# Patient Record
Sex: FEMALE | Race: WHITE | NOT HISPANIC OR LATINO | Employment: FULL TIME | URBAN - METROPOLITAN AREA
[De-identification: names, ages, dates, MRNs, and addresses within clinical notes are randomized per-mention and may not be internally consistent; named-entity substitution may affect disease eponyms.]

---

## 2021-10-08 ENCOUNTER — TELEPHONE (OUTPATIENT)
Dept: SCHEDULING | Facility: IMAGING CENTER | Age: 61
End: 2021-10-08

## 2021-10-12 ENCOUNTER — OFFICE VISIT (OUTPATIENT)
Dept: MEDICAL GROUP | Facility: PHYSICIAN GROUP | Age: 61
End: 2021-10-12
Payer: OTHER GOVERNMENT

## 2021-10-12 ENCOUNTER — APPOINTMENT (OUTPATIENT)
Dept: RADIOLOGY | Facility: IMAGING CENTER | Age: 61
End: 2021-10-12
Attending: NURSE PRACTITIONER
Payer: OTHER GOVERNMENT

## 2021-10-12 VITALS
BODY MASS INDEX: 31.21 KG/M2 | WEIGHT: 182.8 LBS | TEMPERATURE: 100 F | RESPIRATION RATE: 20 BRPM | HEIGHT: 64 IN | SYSTOLIC BLOOD PRESSURE: 130 MMHG | HEART RATE: 126 BPM | OXYGEN SATURATION: 98 % | DIASTOLIC BLOOD PRESSURE: 92 MMHG

## 2021-10-12 DIAGNOSIS — R00.0 TACHYCARDIA: ICD-10-CM

## 2021-10-12 DIAGNOSIS — Z12.31 VISIT FOR SCREENING MAMMOGRAM: ICD-10-CM

## 2021-10-12 DIAGNOSIS — U09.9 COVID-19 LONG HAULER: ICD-10-CM

## 2021-10-12 DIAGNOSIS — Z76.89 ENCOUNTER TO ESTABLISH CARE: ICD-10-CM

## 2021-10-12 DIAGNOSIS — R87.629 ABNORMAL VAGINAL PAP SMEAR: ICD-10-CM

## 2021-10-12 DIAGNOSIS — R73.01 IMPAIRED FASTING GLUCOSE: ICD-10-CM

## 2021-10-12 PROBLEM — R87.611 ATYPICAL SQUAMOUS CELLS CANNOT EXCLUDE HIGH GRADE SQUAMOUS INTRAEPITHELIAL LESION ON CYTOLOGIC SMEAR OF CERVIX (ASC-H): Status: ACTIVE | Noted: 2017-04-26

## 2021-10-12 PROBLEM — N95.0 POST-MENOPAUSAL BLEEDING: Status: ACTIVE | Noted: 2021-07-13

## 2021-10-12 PROBLEM — Z87.891 FORMER SMOKER: Status: ACTIVE | Noted: 2018-05-21

## 2021-10-12 PROBLEM — N87.9 CERVICAL DYSPLASIA: Status: ACTIVE | Noted: 2019-09-20

## 2021-10-12 PROBLEM — Z91.89 DES EXPOSURE IN UTERO: Status: ACTIVE | Noted: 2017-04-26

## 2021-10-12 PROCEDURE — 99214 OFFICE O/P EST MOD 30 MIN: CPT | Mod: CS | Performed by: NURSE PRACTITIONER

## 2021-10-12 PROCEDURE — 71046 X-RAY EXAM CHEST 2 VIEWS: CPT | Mod: TC,FY | Performed by: NURSE PRACTITIONER

## 2021-10-12 RX ORDER — METOPROLOL SUCCINATE 25 MG/1
25 TABLET, EXTENDED RELEASE ORAL DAILY
COMMUNITY
End: 2021-10-20 | Stop reason: SDUPTHER

## 2021-10-12 RX ORDER — DIPHENHYDRAMINE HCL 50 MG
50 CAPSULE ORAL EVERY 6 HOURS PRN
COMMUNITY
End: 2023-08-05

## 2021-10-12 ASSESSMENT — ANXIETY QUESTIONNAIRES
6. BECOMING EASILY ANNOYED OR IRRITABLE: NOT AT ALL
7. FEELING AFRAID AS IF SOMETHING AWFUL MIGHT HAPPEN: NOT AT ALL
5. BEING SO RESTLESS THAT IT IS HARD TO SIT STILL: NOT AT ALL
1. FEELING NERVOUS, ANXIOUS, OR ON EDGE: NOT AT ALL
3. WORRYING TOO MUCH ABOUT DIFFERENT THINGS: NOT AT ALL
4. TROUBLE RELAXING: NOT AT ALL
GAD7 TOTAL SCORE: 0
2. NOT BEING ABLE TO STOP OR CONTROL WORRYING: NOT AT ALL

## 2021-10-12 ASSESSMENT — PATIENT HEALTH QUESTIONNAIRE - PHQ9: CLINICAL INTERPRETATION OF PHQ2 SCORE: 0

## 2021-10-12 NOTE — ASSESSMENT & PLAN NOTE
Patient reports that she took an at home Covid test approximately 6 weeks ago and it was positive for Covid.  1 to 2 weeks after her positive test she was still feeling ill and followed up in the ER where she was diagnosed with Covid pneumonia.  She reports that she was seen at [facility].  I do not have records to review.  She reports that she was sent home on oxygen, steroids and she believes antibiotics.    Patient reports today that she continues to have GI upset, brain fog, rhinitis, constipation as well as rapid heart rate.     Today she denies any chest pain, cough, shortness of breath, chronic fatigue, or headache.

## 2021-10-12 NOTE — ASSESSMENT & PLAN NOTE
Patient reports that this is a new condition.  She reports as of last Wednesday her watch started indicating that she had an elevated heart rate ranging from 100-120's.    Patient was diagnosed with Covid 6 weeks ago.    Positive for headaches and feeling her heart racing.  She denies any palpitations, chest pain, shortness of breath, nausea or vomiting, or jaw or arm pain.

## 2021-10-12 NOTE — PATIENT INSTRUCTIONS
Increase metoprolol to 2 tablets daily for the next 3 days and monitor heart rate.    Sinus Tachycardia    Sinus tachycardia is a kind of fast heartbeat. In sinus tachycardia, the heart beats more than 100 times a minute. Sinus tachycardia starts in a part of the heart called the sinus node. Sinus tachycardia may be harmless, or it may be a sign of a serious condition.  What are the causes?  This condition may be caused by:  · Exercise or exertion.  · A fever.  · Pain.  · Loss of body fluids (dehydration).  · Severe bleeding (hemorrhage).  · Anxiety and stress.  · Certain substances, including:  ? Alcohol.  ? Caffeine.  ? Tobacco and nicotine products.  ? Cold medicines.  ? Illegal drugs.  · Medical conditions including:  ? Heart disease.  ? An infection.  ? An overactive thyroid (hyperthyroidism).  ? A lack of red blood cells (anemia).  What are the signs or symptoms?  Symptoms of this condition include:  · A feeling that the heart is beating quickly (palpitations).  · Suddenly noticing your heartbeat (cardiac awareness).  · Dizziness.  · Tiredness (fatigue).  · Shortness of breath.  · Chest pain.  · Nausea.  · Fainting.  How is this diagnosed?  This condition is diagnosed with:  · A physical exam.  · Other tests, such as:  ? Blood tests.  ? An electrocardiogram (ECG). This test measures the electrical activity of the heart.  ? Ambulatory cardiac monitor. This records your heartbeats for 24 hours or more.  You may be referred to a heart specialist (cardiologist).  How is this treated?  Treatment for this condition depends on the cause or the underlying condition. Treatment may involve:  · Treating the underlying condition.  · Taking new medicines or changing your current medicines as told by your health care provider.  · Making changes to your diet or lifestyle.  Follow these instructions at home:  Lifestyle    · Do not use any products that contain nicotine or tobacco, such as cigarettes and e-cigarettes. If you  need help quitting, ask your health care provider.  · Do not use illegal drugs, such as cocaine.  · Learn relaxation methods to help you when you get stressed or anxious. These include deep breathing.  · Avoid caffeine or other stimulants.  Alcohol use    · Do not drink alcohol if:  ? Your health care provider tells you not to drink.  ? You are pregnant, may be pregnant, or are planning to become pregnant.  · If you drink alcohol, limit how much you have:  ? 0-1 drink a day for women.  ? 0-2 drinks a day for men.  · Be aware of how much alcohol is in your drink. In the [place] one drink equals one typical bottle of beer (12 oz), one-half glass of wine (5 oz), or one shot of hard liquor (1½ oz).  General instructions  · Drink enough fluids to keep your urine pale yellow.  · Take over-the-counter and prescription medicines only as told by your health care provider.  · Keep all follow-up visits as told by your health care provider. This is important.  Contact a health care provider if you have:  · A fever.  · Vomiting or diarrhea that does not go away.  Get help right away if you:  · Have pain in your chest, upper arms, jaw, or neck.  · Become weak or dizzy.  · Feel faint.  · Have palpitations that do not go away.  Summary  · In sinus tachycardia, the heart beats more than 100 times a minute.  · Sinus tachycardia may be harmless, or it may be a sign of a serious condition.  · Treatment for this condition depends on the cause or the underlying condition.  · Get help right away if you have pain in your chest, upper arms, jaw, or neck.  This information is not intended to replace advice given to you by your health care provider. Make sure you discuss any questions you have with your health care provider.  Document Released: 01/25/2006 Document Revised: 02/06/2019 Document Reviewed: 02/06/2019  Elsevier Patient Education © 2020 [employer]

## 2021-10-12 NOTE — PROGRESS NOTES
Chief Complaint   Patient presents with   • Establish Care     6w post covid GI concerns, BP high       Subjective:     HPI:   Kat Lama is a 61 y.o. female here to discuss the evaluation and management of:      COVID-19 long hauler  Patient reports that she took an at home Covid test approximately 6 weeks ago and it was positive for Covid.  1 to 2 weeks after her positive test she was still feeling ill and followed up in the ER where she was diagnosed with Covid pneumonia.  She reports that she was seen at [facility].  I do not have records to review.  She reports that she was sent home on oxygen, steroids and she believes antibiotics.    Patient reports today that she continues to have GI upset, brain fog, rhinitis, constipation as well as rapid heart rate.     Today she denies any chest pain, cough, shortness of breath, chronic fatigue, or headache.    Tachycardia  Patient reports that this is a new condition.  She reports as of last Wednesday her watch started indicating that she had an elevated heart rate ranging from 100-120's.    Patient was diagnosed with Covid 6 weeks ago.    Positive for headaches and feeling her heart racing.  She denies any palpitations, chest pain, shortness of breath, nausea or vomiting, or jaw or arm pain.        Abnormal vaginal Pap smear  Pap was performed proximately 3 weeks ago in [place].  Reviewed records indicating positive for HPV high risk.  Patient is asking for referral to GYN today.  I do feel this is appropriate referral was placed.  She does report that she is positive for intermittent bleeding      ROS:  Gen: no fevers/chills, no changes in weight  Eyes: no changes in vision  ENT: no sore throat, no hearing loss, no bloody nose  Pulm: no sob, no cough  CV: Positive per HPI  GI: no nausea/vomiting, no diarrhea  : no dysuria  MSk: no myalgias  Skin: no rash  Neuro: no headaches, no numbness/tingling  Heme/Lymph: no easy bruising    No  Known Allergies    Current medicines (including changes today)  Current Outpatient Medications   Medication Sig Dispense Refill   • metoprolol SR (TOPROL XL) 25 MG TABLET SR 24 HR Take 25 mg by mouth every day.     • diphenhydrAMINE (BENADRYL) 50 MG Cap Take 50 mg by mouth every 6 hours as needed.       No current facility-administered medications for this visit.       Social History     Tobacco Use   • Smoking status: Former Smoker     Packs/day: 0.50     Years: 15.00     Pack years: 7.50     Types: Cigarettes   • Smokeless tobacco: Never Used   Vaping Use   • Vaping Use: Former   • Substances: Nicotine, Flavoring   Substance Use Topics   • Alcohol use: Not Currently   • Drug use: Not on file       Patient Active Problem List    Diagnosis Date Noted   • COVID-19 long hauler 10/12/2021   • Tachycardia 10/12/2021   • Post-menopausal bleeding 07/13/2021   • Impaired fasting glucose 10/01/2020   • Cervical dysplasia 09/20/2019   • Former smoker 05/21/2018   • DURGA exposure in utero 04/26/2017   • Atypical squamous cells cannot exclude high grade squamous intraepithelial lesion on cytologic smear of cervix (ASC-H) 04/26/2017   • Abnormal vaginal Pap smear 02/09/2017   • Gastroesophageal reflux disease without esophagitis 11/18/2015   • Bilateral hearing loss 11/18/2015       Family History   Problem Relation Age of Onset   • Hypertension Mother    • Hyperlipidemia Mother    • Clotting Disorder Mother         blood clots   • Stroke Mother         brain bleed   • Arrythmia Mother         A- fib   • Cancer Father         lung- smoker   • Clotting Disorder Brother         blood clot   • No Known Problems Maternal Grandmother    • No Known Problems Maternal Grandfather    • No Known Problems Paternal Grandmother    • No Known Problems Paternal Grandfather           Objective:       /92 (BP Location: Left arm, Patient Position: Sitting, BP Cuff Size: Adult)   Pulse (!) 126   Temp 37.8 °C (100 °F) (Temporal)   Resp 20   " Ht 1.613 m (5' 3.5\")   Wt 82.9 kg (182 lb 12.8 oz)   SpO2 98%  Body mass index is 31.87 kg/m².    Physical Exam:  Constitutional: Well-developed and well-nourished female in NAD. Not diaphoretic. No distress.   Skin: warm, dry, intact, no evidence of rash or concerning lesions  Head: Atraumatic without lesions.  Eyes: Conjunctivae and extraocular motions are normal. Pupils are equal, round, and reactive to light. No scleral icterus.   Ears:  External ears unremarkable. TMs normal; bilaterally  Mouth/Throat: Tongue normal. Oropharynx is clear and moist. Posterior pharynx without erythema or exudates.  Neck: Supple, trachea midline. No thyromegaly present. No cervical or supraclavicular lymphadenopathy.  Cardiovascular: Tachycardic rate and sinus rhythm without murmur. Radial pulses are intact and equal bilaterally.  Pulmonary: Clear to ausculation. Normal effort. No rales, ronchi, or wheezing.  Abdomen: Soft,  tender with palpation over the suprapubic area, and without distention. Active bowel sounds in all four quadrants. No rebound, guarding, masses   Extremities: No cyanosis, clubbing, erythema, nor edema.   Neurological: Alert and oriented x 3. No cranial nerve deficit. 5/5 myotomes. Sensation intact.   Psychiatric:  Behavior, mood, and affect are appropriate.    EKG Interpretation    Interpreted by AMARIS Wolff    Rhythm: sinus tachycardia  Rate: tachycardia  Axis: normal  Ectopy: none  Conduction: normal  ST Segments: normal  T Waves: normal  Q Waves: none    Clinical Impression: sinus tachycardia      10/12/2021 2:02 PM     HISTORY/REASON FOR EXAM: Tachycardic on exam.     TECHNIQUE/EXAM DESCRIPTION AND NUMBER OF VIEWS:  Two views of the chest.     COMPARISON:  None.     FINDINGS:  Cardiomediastinal contours are normal.     No pulmonary consolidation is seen.     No pleural space process is evident.     IMPRESSION:     No radiographic evidence of acute cardiopulmonary process.    Assessment and " Plan:     The following treatment plan was discussed:      1. Encounter to establish care    2. COVID-19 long hauler  This is a newly assessed condition.  Discussed with patient that post Covid symptoms on average last 4 to 6 months.  Encourage patient to stay well-hydrated and eat well-balanced meals as well as increasing exercise as tolerated to help decrease symptoms.    3. Tachycardia  This is a new condition.  EKG showed sinus tachycardia.  ER precautions were discussed with patient.  Encouraged to stay adequately hydrated. Follow-up visit scheduled for next week with labs.    18:43-called patient and reviewed x-ray results with her and answered all questions. Discussed taking 2 tablets of her metoprolol if heart rate is between 90 and 120 and only take 1 tablet if her heart rate is less than 90.  - DX-CHEST-2 VIEWS; Future  - EKG - Clinic Performed  - CBC WITH DIFFERENTIAL; Future  - TSH WITH REFLEX TO FT4; Future  - Comp Metabolic Panel; Future  - HEMOGLOBIN A1C; Future    4. Impaired fasting glucose  Chronic condition, current status unknown.  Patient is due for labs.  Orders as indicated below.  Discussed the importance of appropriate diet and healthy lifestyle modifications.  - Lipid Profile; Future  - HEMOGLOBIN A1C; Future    5. Abnormal vaginal Pap smear  - REFERRAL TO GYNECOLOGY    6. Visit for screening mammogram  - MA-SCREENING MAMMO BILAT W/TOMOSYNTHESIS W/CAD; Future    Other orders  - metoprolol SR (TOPROL XL) 25 MG TABLET SR 24 HR; Take 25 mg by mouth every day.  - diphenhydrAMINE (BENADRYL) 50 MG Cap; Take 50 mg by mouth every 6 hours as needed.    Any change or worsening of signs or symptoms, patient encouraged to follow-up or report to emergency room for further evaluation. Patient verbalizes understanding and agrees.    Follow-Up: Return in about 1 week (around 10/19/2021) for Follow up labs.      PLEASE NOTE: This dictation was created using voice recognition software. I have made every  reasonable attempt to correct obvious errors, but I expect that there are errors of grammar and possibly content that I did not discover before finalizing the note.   66

## 2021-10-12 NOTE — ASSESSMENT & PLAN NOTE
Pap was performed proximately 3 weeks ago in [place].  Reviewed records indicating positive for HPV high risk.  Patient is asking for referral to GYN today.  I do feel this is appropriate referral was placed.  She does report that she is positive for intermittent bleeding

## 2021-10-14 ENCOUNTER — HOSPITAL ENCOUNTER (OUTPATIENT)
Dept: LAB | Facility: MEDICAL CENTER | Age: 61
End: 2021-10-14
Attending: NURSE PRACTITIONER
Payer: OTHER GOVERNMENT

## 2021-10-14 DIAGNOSIS — R73.01 IMPAIRED FASTING GLUCOSE: ICD-10-CM

## 2021-10-14 DIAGNOSIS — R00.0 TACHYCARDIA: ICD-10-CM

## 2021-10-14 LAB
ALBUMIN SERPL BCP-MCNC: 4.2 G/DL (ref 3.2–4.9)
ALBUMIN/GLOB SERPL: 1.6 G/DL
ALP SERPL-CCNC: 72 U/L (ref 30–99)
ALT SERPL-CCNC: 10 U/L (ref 2–50)
ANION GAP SERPL CALC-SCNC: 11 MMOL/L (ref 7–16)
AST SERPL-CCNC: 9 U/L (ref 12–45)
BASOPHILS # BLD AUTO: 0.6 % (ref 0–1.8)
BASOPHILS # BLD: 0.04 K/UL (ref 0–0.12)
BILIRUB SERPL-MCNC: 0.2 MG/DL (ref 0.1–1.5)
BUN SERPL-MCNC: 6 MG/DL (ref 8–22)
CALCIUM SERPL-MCNC: 9.5 MG/DL (ref 8.5–10.5)
CHLORIDE SERPL-SCNC: 106 MMOL/L (ref 96–112)
CHOLEST SERPL-MCNC: 264 MG/DL (ref 100–199)
CO2 SERPL-SCNC: 26 MMOL/L (ref 20–33)
CREAT SERPL-MCNC: 0.48 MG/DL (ref 0.5–1.4)
EOSINOPHIL # BLD AUTO: 0 K/UL (ref 0–0.51)
EOSINOPHIL NFR BLD: 0 % (ref 0–6.9)
ERYTHROCYTE [DISTWIDTH] IN BLOOD BY AUTOMATED COUNT: 47.9 FL (ref 35.9–50)
EST. AVERAGE GLUCOSE BLD GHB EST-MCNC: 140 MG/DL
FASTING STATUS PATIENT QL REPORTED: NORMAL
GLOBULIN SER CALC-MCNC: 2.7 G/DL (ref 1.9–3.5)
GLUCOSE SERPL-MCNC: 99 MG/DL (ref 65–99)
HBA1C MFR BLD: 6.5 % (ref 4–5.6)
HCT VFR BLD AUTO: 39.7 % (ref 37–47)
HDLC SERPL-MCNC: 47 MG/DL
HGB BLD-MCNC: 12.6 G/DL (ref 12–16)
IMM GRANULOCYTES # BLD AUTO: 0.12 K/UL (ref 0–0.11)
IMM GRANULOCYTES NFR BLD AUTO: 1.7 % (ref 0–0.9)
LDLC SERPL CALC-MCNC: 192 MG/DL
LYMPHOCYTES # BLD AUTO: 2.3 K/UL (ref 1–4.8)
LYMPHOCYTES NFR BLD: 32.7 % (ref 22–41)
MCH RBC QN AUTO: 28.8 PG (ref 27–33)
MCHC RBC AUTO-ENTMCNC: 31.7 G/DL (ref 33.6–35)
MCV RBC AUTO: 90.8 FL (ref 81.4–97.8)
MONOCYTES # BLD AUTO: 0.65 K/UL (ref 0–0.85)
MONOCYTES NFR BLD AUTO: 9.2 % (ref 0–13.4)
NEUTROPHILS # BLD AUTO: 3.92 K/UL (ref 2–7.15)
NEUTROPHILS NFR BLD: 55.8 % (ref 44–72)
NRBC # BLD AUTO: 0 K/UL
NRBC BLD-RTO: 0 /100 WBC
PLATELET # BLD AUTO: 566 K/UL (ref 164–446)
PMV BLD AUTO: 9.9 FL (ref 9–12.9)
POTASSIUM SERPL-SCNC: 4.2 MMOL/L (ref 3.6–5.5)
PROT SERPL-MCNC: 6.9 G/DL (ref 6–8.2)
RBC # BLD AUTO: 4.37 M/UL (ref 4.2–5.4)
SODIUM SERPL-SCNC: 143 MMOL/L (ref 135–145)
TRIGL SERPL-MCNC: 127 MG/DL (ref 0–149)
TSH SERPL DL<=0.005 MIU/L-ACNC: 1.08 UIU/ML (ref 0.38–5.33)
WBC # BLD AUTO: 7 K/UL (ref 4.8–10.8)

## 2021-10-14 PROCEDURE — 84443 ASSAY THYROID STIM HORMONE: CPT

## 2021-10-14 PROCEDURE — 85025 COMPLETE CBC W/AUTO DIFF WBC: CPT

## 2021-10-14 PROCEDURE — 83036 HEMOGLOBIN GLYCOSYLATED A1C: CPT

## 2021-10-14 PROCEDURE — 36415 COLL VENOUS BLD VENIPUNCTURE: CPT

## 2021-10-14 PROCEDURE — 80053 COMPREHEN METABOLIC PANEL: CPT

## 2021-10-14 PROCEDURE — 80061 LIPID PANEL: CPT

## 2021-10-20 RX ORDER — METOPROLOL SUCCINATE 25 MG/1
25 TABLET, EXTENDED RELEASE ORAL DAILY
Qty: 90 TABLET | Refills: 3 | Status: SHIPPED | OUTPATIENT
Start: 2021-10-20 | End: 2021-11-02 | Stop reason: SDUPTHER

## 2021-10-20 NOTE — TELEPHONE ENCOUNTER
Received request via: Patient    Was the patient seen in the last year in this department? Yes    Does the patient have an active prescription (recently filled or refills available) for medication(s) requested? No     Requested Prescriptions     Pending Prescriptions Disp Refills   • metoprolol SR (TOPROL XL) 25 MG TABLET SR 24 HR 90 Tablet 3     Sig: Take 1 Tablet by mouth every day.

## 2021-11-02 ENCOUNTER — OFFICE VISIT (OUTPATIENT)
Dept: MEDICAL GROUP | Facility: PHYSICIAN GROUP | Age: 61
End: 2021-11-02
Payer: OTHER GOVERNMENT

## 2021-11-02 VITALS
HEART RATE: 84 BPM | BODY MASS INDEX: 32.17 KG/M2 | HEIGHT: 64 IN | WEIGHT: 188.4 LBS | OXYGEN SATURATION: 100 % | DIASTOLIC BLOOD PRESSURE: 84 MMHG | TEMPERATURE: 96.8 F | SYSTOLIC BLOOD PRESSURE: 128 MMHG

## 2021-11-02 DIAGNOSIS — E11.59 TYPE 2 DIABETES MELLITUS WITH OTHER CIRCULATORY COMPLICATION, WITHOUT LONG-TERM CURRENT USE OF INSULIN (HCC): ICD-10-CM

## 2021-11-02 DIAGNOSIS — E78.2 MIXED HYPERLIPIDEMIA: ICD-10-CM

## 2021-11-02 DIAGNOSIS — I10 PRIMARY HYPERTENSION: ICD-10-CM

## 2021-11-02 PROBLEM — E11.9 DIABETES MELLITUS (HCC): Status: ACTIVE | Noted: 2021-11-02

## 2021-11-02 PROCEDURE — 99214 OFFICE O/P EST MOD 30 MIN: CPT | Performed by: NURSE PRACTITIONER

## 2021-11-02 RX ORDER — LOSARTAN POTASSIUM 25 MG/1
25 TABLET ORAL DAILY
Qty: 90 TABLET | Refills: 3 | Status: SHIPPED | OUTPATIENT
Start: 2021-11-02 | End: 2021-11-09

## 2021-11-02 RX ORDER — ATORVASTATIN CALCIUM 20 MG/1
20 TABLET, FILM COATED ORAL EVERY EVENING
Qty: 90 TABLET | Refills: 1 | Status: SHIPPED | OUTPATIENT
Start: 2021-11-02 | End: 2022-04-26

## 2021-11-02 RX ORDER — METOPROLOL SUCCINATE 50 MG/1
50 TABLET, EXTENDED RELEASE ORAL DAILY
Qty: 90 TABLET | Refills: 3 | Status: SHIPPED | OUTPATIENT
Start: 2021-11-02 | End: 2022-11-01

## 2021-11-02 ASSESSMENT — FIBROSIS 4 INDEX: FIB4 SCORE: 0.31

## 2021-11-02 NOTE — ASSESSMENT & PLAN NOTE
This is a chronic condition, suboptimally controlled  Was seen by her primary care provider several weeks ago and started on metoprolol for tachycardia  At her last primary care provider before moving to the area she had been prescribed 50 mg daily for hypertension have been taking a half a pill, as of late she has been taking the full 50 mg pills  She brings in at home records which are variable, for the most part they are normal but has several episodes of hypertension with readings in the 150s over the 100s  Today she does agree to consistently taking the metoprolol sustained release 50 mg daily and we will add losartan 25 mg daily  Advised her to continue to monitor her blood pressure at home but follow-up in 1 week with MA for blood pressure check  Discussed with her the risks, benefits and side effects associated with these medications  She is up-to-date with labs  Advised her to follow-up with her regular PCP in 3 to 4 months

## 2021-11-02 NOTE — PATIENT INSTRUCTIONS
Stay on metoprolol 50 mg daily, will add losartan 25 mg daily    BP check in 1 week, continue to monitor BP at home, bring BP cuff to BP check with MA    Follow up with Cathleen in 3-4 months with labs before visit    Will start atorvastatin 20 mg daily--for cholesterol    Get COVID vaccine when you can

## 2021-11-02 NOTE — ASSESSMENT & PLAN NOTE
Patient here to review labs  Patient was found to have significantly elevated LDL  She does have history of hypertension and type 2 diabetes  She agrees to starting statin, she will start atorvastatin 20 mg daily  Discussed with her the risks, benefits and side effects associated with this medication  Advised patient to continue with healthy lifestyle modifications and follow-up with her regular PCP in 3 to 4 months with labs done before visit

## 2021-11-02 NOTE — PROGRESS NOTES
Chief Complaint   Patient presents with   • Follow-Up     labs, meds, bp       HISTORY OF PRESENT ILLNESS: Patient is a 61 y.o. female established patient who presents today to follow up, review labs    Primary hypertension  This is a chronic condition, suboptimally controlled  Was seen by her primary care provider several weeks ago and started on metoprolol for tachycardia  At her last primary care provider before moving to the area she had been prescribed 50 mg daily for hypertension have been taking a half a pill, as of late she has been taking the full 50 mg pills  She brings in at home records which are variable, for the most part they are normal but has several episodes of hypertension with readings in the 150s over the 100s  Today she does agree to consistently taking the metoprolol sustained release 50 mg daily and we will add losartan 25 mg daily  Advised her to continue to monitor her blood pressure at home but follow-up in 1 week with MA for blood pressure check  Discussed with her the risks, benefits and side effects associated with these medications  She is up-to-date with labs  Advised her to follow-up with her regular PCP in 3 to 4 months    Mixed hyperlipidemia  Patient here to review labs  Patient was found to have significantly elevated LDL  She does have history of hypertension and type 2 diabetes  She agrees to starting statin, she will start atorvastatin 20 mg daily  Discussed with her the risks, benefits and side effects associated with this medication  Advised patient to continue with healthy lifestyle modifications and follow-up with her regular PCP in 3 to 4 months with labs done before visit    Diabetes mellitus (HCC)  Patient does have history of type 2 diabetes, currently taking Metformin  mg daily  Her most recent A1c is 6.5%, this is well controlled  She is now appropriately on ARB as well as statin (see additional notes)  Does not need refills on Metformin at this time  Advised  to follow-up with her regular PCP in 3 to 4 months with labs and before visit  Discussed the importance of ongoing healthy lifestyle modifications      Patient Active Problem List    Diagnosis Date Noted   • Primary hypertension 2021   • Mixed hyperlipidemia 2021   • Diabetes mellitus (HCC) 2021   • COVID-19 long hauler 10/12/2021   • Tachycardia 10/12/2021   • Post-menopausal bleeding 2021   • Impaired fasting glucose 10/01/2020   • Cervical dysplasia 2019   • Former smoker 2018   • DURGA exposure in utero 2017   • Atypical squamous cells cannot exclude high grade squamous intraepithelial lesion on cytologic smear of cervix (ASC-H) 2017   • Abnormal vaginal Pap smear 2017   • Gastroesophageal reflux disease without esophagitis 2015   • Bilateral hearing loss 2015       Allergies:Patient has no known allergies.    Current Outpatient Medications   Medication Sig Dispense Refill   • metoprolol SR (TOPROL XL) 50 MG TABLET SR 24 HR Take 1 Tablet by mouth every day. 90 Tablet 3   • losartan (COZAAR) 25 MG Tab Take 1 Tablet by mouth every day. 90 Tablet 3   • metFORMIN (GLUCOPHAGE) 500 MG Tab Take 500 mg by mouth 2 times a day with meals.     • atorvastatin (LIPITOR) 20 MG Tab Take 1 Tablet by mouth every evening. 90 Tablet 1   • diphenhydrAMINE (BENADRYL) 50 MG Cap Take 50 mg by mouth every 6 hours as needed.       No current facility-administered medications for this visit.       Social History     Tobacco Use   • Smoking status: Former Smoker     Packs/day: 0.50     Years: 15.00     Pack years: 7.50     Types: Cigarettes   • Smokeless tobacco: Never Used   Vaping Use   • Vaping Use: Former   • Substances: Nicotine, Flavoring   Substance Use Topics   • Alcohol use: Not Currently   • Drug use: Not on file       Family Status   Relation Name Status   • Mo     • Fa     • Bro  (Not Specified)   • MGMo     • MGFa     • PGMo   "   • PGFa       Family History   Problem Relation Age of Onset   • Hypertension Mother    • Hyperlipidemia Mother    • Clotting Disorder Mother         blood clots   • Stroke Mother         brain bleed   • Arrythmia Mother         A- fib   • Cancer Father         lung- smoker   • Clotting Disorder Brother         blood clot   • No Known Problems Maternal Grandmother    • No Known Problems Maternal Grandfather    • No Known Problems Paternal Grandmother    • No Known Problems Paternal Grandfather        Review of Systems:   Constitutional: Negative for fever, chills, weight loss and malaise/fatigue.   Respiratory: Negative for cough, sputum production, shortness of breath and wheezing.    Cardiovascular: Negative for chest pain, palpitations, orthopnea and leg swelling.   Gastrointestinal: Negative for heartburn, nausea, vomiting and abdominal pain.   Genitourinary/Renal: Negative for dysuria, urgency and frequency.   Musculoskeletal: Negative for myalgias, back pain and joint pain.   Skin: Negative for rash and itching.   Neurological: Negative for dizziness, tingling, tremors, sensory change, focal weakness and headaches.   Endo/Heme/Allergies: Does not bruise/bleed easily.       Exam:  /84 (BP Location: Left arm, Patient Position: Sitting)   Pulse 84   Temp 36 °C (96.8 °F) (Temporal)   Ht 1.613 m (5' 3.5\")   Wt 85.5 kg (188 lb 6.4 oz)   SpO2 100%   General:  Well nourished, well developed female in NAD  Skin: warm, dry, intact, no evidence of rash or concerning lesions  Head: is grossly normal.  HEENT: eyes clear, conjunctiva normal, PERRLA,TMs normal; bilaterally  Neck: Supple without JVD or bruit. Thyroid is not enlarged.  Pulmonary: Clear to ausculation. Normal effort. No rales, ronchi, or wheezing.  Cardiovascular: Regular rate and rhythm without murmur. Carotid and radial pulses are intact and equal bilaterally.  Abdomen: soft, non-tender, positive bowel sounds  Musculoskeletal: no " clubbing, cyanosis, or edema.  Psych/mental: no depression, anxiety, hallucinations  Neuro: alert, intact, CN 2-12 grossly intact      Medical decision-making and discussion:        Assessment/Plan:    1. Primary hypertension  -take meds as prescribed and call for refills when needed  -monitor BP at home if warranted  -have labs done when due/ordered  -continue with healthy lifestyle modifications    - metoprolol SR (TOPROL XL) 50 MG TABLET SR 24 HR; Take 1 Tablet by mouth every day.  Dispense: 90 Tablet; Refill: 3  - losartan (COZAAR) 25 MG Tab; Take 1 Tablet by mouth every day.  Dispense: 90 Tablet; Refill: 3  - Lipid Profile; Future  - Comp Metabolic Panel; Future  - CBC WITH DIFFERENTIAL; Future    2. Mixed hyperlipidemia  -take meds as prescribed and call for refills when needed  -have labs done when due/ordered  -continue with healthy lifestyle modifications    - atorvastatin (LIPITOR) 20 MG Tab; Take 1 Tablet by mouth every evening.  Dispense: 90 Tablet; Refill: 1  - Lipid Profile; Future  - Comp Metabolic Panel; Future    3. Type 2 diabetes mellitus with other circulatory complication, without long-term current use of insulin (HCC)  -continue with healthy lifestyle modifications  -take meds as prescribed and call for refills when needed  -have labs done when due  -stay up to date with annual eye exams  -continue to monitor glucose as recommended             Return in about 4 months (around 3/2/2022) for Discuss Labs, Follow-up.        Please note that this dictation was created using voice recognition software. I have made every reasonable attempt to correct obvious errors, but I expect that there are errors of grammar and possibly content that I did not discover before finalizing the note.

## 2021-11-02 NOTE — ASSESSMENT & PLAN NOTE
Patient does have history of type 2 diabetes, currently taking Metformin  mg daily  Her most recent A1c is 6.5%, this is well controlled  She is now appropriately on ARB as well as statin (see additional notes)  Does not need refills on Metformin at this time  Advised to follow-up with her regular PCP in 3 to 4 months with labs and before visit  Discussed the importance of ongoing healthy lifestyle modifications

## 2021-11-09 ENCOUNTER — NON-PROVIDER VISIT (OUTPATIENT)
Dept: MEDICAL GROUP | Facility: PHYSICIAN GROUP | Age: 61
End: 2021-11-09
Payer: OTHER GOVERNMENT

## 2021-11-09 VITALS — SYSTOLIC BLOOD PRESSURE: 140 MMHG | DIASTOLIC BLOOD PRESSURE: 92 MMHG

## 2021-11-09 DIAGNOSIS — I10 PRIMARY HYPERTENSION: ICD-10-CM

## 2021-11-09 RX ORDER — LOSARTAN POTASSIUM 50 MG/1
50 TABLET ORAL DAILY
Qty: 90 TABLET | Refills: 0 | Status: SHIPPED | OUTPATIENT
Start: 2021-11-09 | End: 2022-02-03 | Stop reason: SDUPTHER

## 2021-11-09 NOTE — PROGRESS NOTES
Patient was here for MA visit for follow-up hypertension.  Patient is blood pressure was 140/92.  Reports at home blood pressure readings high one twenties to one forties systolically.  Increase losartan to 50 mg daily.  Patient will come back in in 1 week for MA recheck blood pressure.

## 2021-11-09 NOTE — NON-PROVIDER
Kat Lama is a 61 y.o. female here for a non-provider visit for BP CHECK    If abnormal was an in office provider notified today (if so, indicate provider)? Yes    Routed to PCP? Yes

## 2021-11-16 ENCOUNTER — NON-PROVIDER VISIT (OUTPATIENT)
Dept: MEDICAL GROUP | Facility: PHYSICIAN GROUP | Age: 61
End: 2021-11-16
Payer: OTHER GOVERNMENT

## 2021-11-16 VITALS — DIASTOLIC BLOOD PRESSURE: 82 MMHG | SYSTOLIC BLOOD PRESSURE: 124 MMHG

## 2021-11-16 NOTE — NON-PROVIDER
Kat Lama is a 61 y.o. female here for a non-provider visit for BPcheck     Vitals:    11/16/21 1314   BP: 124/82   BP Location: Left arm   Patient Position: Sitting   BP Cuff Size: Adult     If abnormal, was the Registered Nurse (office provider if RN is unavailable) notified today? No  Routed to PCP? Yes

## 2021-12-28 ENCOUNTER — PATIENT MESSAGE (OUTPATIENT)
Dept: MEDICAL GROUP | Facility: PHYSICIAN GROUP | Age: 61
End: 2021-12-28

## 2021-12-28 DIAGNOSIS — E11.59 TYPE 2 DIABETES MELLITUS WITH OTHER CIRCULATORY COMPLICATION, WITHOUT LONG-TERM CURRENT USE OF INSULIN (HCC): ICD-10-CM

## 2021-12-28 NOTE — PATIENT COMMUNICATION
Received request via: Patient    Was the patient seen in the last year in this department? Yes   Last OV 11/2/21    Does the patient have an active prescription (recently filled or refills available) for medication(s) requested? No    Requested Prescriptions     Pending Prescriptions Disp Refills   • metFORMIN (GLUCOPHAGE) 500 MG Tab 180 Tablet 3     Sig: Take 1 Tablet by mouth 2 times a day with meals.

## 2022-02-03 DIAGNOSIS — I10 PRIMARY HYPERTENSION: ICD-10-CM

## 2022-02-03 RX ORDER — LOSARTAN POTASSIUM 50 MG/1
50 TABLET ORAL DAILY
Qty: 90 TABLET | Refills: 0 | Status: SHIPPED | OUTPATIENT
Start: 2022-02-03 | End: 2022-04-26

## 2022-02-03 NOTE — TELEPHONE ENCOUNTER
Received request via: Pharmacy    Was the patient seen in the last year in this department? Yes    Does the patient have an active prescription (recently filled or refills available) for medication(s) requested? No     Last office visit: 11/02/2021

## 2022-02-25 ENCOUNTER — HOSPITAL ENCOUNTER (OUTPATIENT)
Dept: LAB | Facility: MEDICAL CENTER | Age: 62
End: 2022-02-25
Attending: NURSE PRACTITIONER
Payer: OTHER GOVERNMENT

## 2022-02-25 DIAGNOSIS — E78.2 MIXED HYPERLIPIDEMIA: ICD-10-CM

## 2022-02-25 DIAGNOSIS — I10 PRIMARY HYPERTENSION: ICD-10-CM

## 2022-02-25 LAB
ALBUMIN SERPL BCP-MCNC: 4.3 G/DL (ref 3.2–4.9)
ALBUMIN/GLOB SERPL: 2 G/DL
ALP SERPL-CCNC: 58 U/L (ref 30–99)
ALT SERPL-CCNC: 14 U/L (ref 2–50)
ANION GAP SERPL CALC-SCNC: 10 MMOL/L (ref 7–16)
AST SERPL-CCNC: 17 U/L (ref 12–45)
BASOPHILS # BLD AUTO: 0.2 % (ref 0–1.8)
BASOPHILS # BLD: 0.01 K/UL (ref 0–0.12)
BILIRUB SERPL-MCNC: 0.4 MG/DL (ref 0.1–1.5)
BUN SERPL-MCNC: 11 MG/DL (ref 8–22)
CALCIUM SERPL-MCNC: 9.4 MG/DL (ref 8.5–10.5)
CHLORIDE SERPL-SCNC: 106 MMOL/L (ref 96–112)
CHOLEST SERPL-MCNC: 172 MG/DL (ref 100–199)
CO2 SERPL-SCNC: 25 MMOL/L (ref 20–33)
CREAT SERPL-MCNC: 0.68 MG/DL (ref 0.5–1.4)
EOSINOPHIL # BLD AUTO: 0 K/UL (ref 0–0.51)
EOSINOPHIL NFR BLD: 0 % (ref 0–6.9)
ERYTHROCYTE [DISTWIDTH] IN BLOOD BY AUTOMATED COUNT: 41.4 FL (ref 35.9–50)
FASTING STATUS PATIENT QL REPORTED: NORMAL
GLOBULIN SER CALC-MCNC: 2.1 G/DL (ref 1.9–3.5)
GLUCOSE SERPL-MCNC: 120 MG/DL (ref 65–99)
HCT VFR BLD AUTO: 40 % (ref 37–47)
HDLC SERPL-MCNC: 52 MG/DL
HGB BLD-MCNC: 13 G/DL (ref 12–16)
IMM GRANULOCYTES # BLD AUTO: 0.02 K/UL (ref 0–0.11)
IMM GRANULOCYTES NFR BLD AUTO: 0.3 % (ref 0–0.9)
LDLC SERPL CALC-MCNC: 103 MG/DL
LYMPHOCYTES # BLD AUTO: 1.4 K/UL (ref 1–4.8)
LYMPHOCYTES NFR BLD: 21.3 % (ref 22–41)
MCH RBC QN AUTO: 29.7 PG (ref 27–33)
MCHC RBC AUTO-ENTMCNC: 32.5 G/DL (ref 33.6–35)
MCV RBC AUTO: 91.5 FL (ref 81.4–97.8)
MONOCYTES # BLD AUTO: 0.4 K/UL (ref 0–0.85)
MONOCYTES NFR BLD AUTO: 6.1 % (ref 0–13.4)
NEUTROPHILS # BLD AUTO: 4.75 K/UL (ref 2–7.15)
NEUTROPHILS NFR BLD: 72.1 % (ref 44–72)
NRBC # BLD AUTO: 0 K/UL
NRBC BLD-RTO: 0 /100 WBC
PLATELET # BLD AUTO: 250 K/UL (ref 164–446)
PMV BLD AUTO: 11.2 FL (ref 9–12.9)
POTASSIUM SERPL-SCNC: 4.5 MMOL/L (ref 3.6–5.5)
PROT SERPL-MCNC: 6.4 G/DL (ref 6–8.2)
RBC # BLD AUTO: 4.37 M/UL (ref 4.2–5.4)
SODIUM SERPL-SCNC: 141 MMOL/L (ref 135–145)
TRIGL SERPL-MCNC: 85 MG/DL (ref 0–149)
WBC # BLD AUTO: 6.6 K/UL (ref 4.8–10.8)

## 2022-02-25 PROCEDURE — 85025 COMPLETE CBC W/AUTO DIFF WBC: CPT

## 2022-02-25 PROCEDURE — 80061 LIPID PANEL: CPT

## 2022-02-25 PROCEDURE — 80053 COMPREHEN METABOLIC PANEL: CPT

## 2022-02-25 PROCEDURE — 36415 COLL VENOUS BLD VENIPUNCTURE: CPT

## 2022-03-01 ENCOUNTER — TELEPHONE (OUTPATIENT)
Dept: MEDICAL GROUP | Facility: PHYSICIAN GROUP | Age: 62
End: 2022-03-01
Payer: OTHER GOVERNMENT

## 2022-03-02 NOTE — TELEPHONE ENCOUNTER
----- Message from Vj Nelson M.D. sent at 2/28/2022  9:25 AM PST -----  Labs stable needs to f/u with new PCP since provider left

## 2022-03-04 ENCOUNTER — OFFICE VISIT (OUTPATIENT)
Dept: MEDICAL GROUP | Facility: PHYSICIAN GROUP | Age: 62
End: 2022-03-04
Payer: OTHER GOVERNMENT

## 2022-03-04 VITALS
WEIGHT: 192 LBS | BODY MASS INDEX: 31.99 KG/M2 | OXYGEN SATURATION: 96 % | DIASTOLIC BLOOD PRESSURE: 80 MMHG | RESPIRATION RATE: 14 BRPM | HEART RATE: 65 BPM | TEMPERATURE: 98.2 F | HEIGHT: 65 IN | SYSTOLIC BLOOD PRESSURE: 120 MMHG

## 2022-03-04 DIAGNOSIS — Z12.31 ENCOUNTER FOR SCREENING MAMMOGRAM FOR BREAST CANCER: ICD-10-CM

## 2022-03-04 DIAGNOSIS — R87.611 ATYPICAL SQUAMOUS CELLS CANNOT EXCLUDE HIGH GRADE SQUAMOUS INTRAEPITHELIAL LESION ON CYTOLOGIC SMEAR OF CERVIX (ASC-H): ICD-10-CM

## 2022-03-04 DIAGNOSIS — Z12.11 SCREENING FOR COLON CANCER: ICD-10-CM

## 2022-03-04 DIAGNOSIS — Z11.59 ENCOUNTER FOR HEPATITIS C SCREENING TEST FOR LOW RISK PATIENT: ICD-10-CM

## 2022-03-04 DIAGNOSIS — E11.59 TYPE 2 DIABETES MELLITUS WITH OTHER CIRCULATORY COMPLICATION, WITHOUT LONG-TERM CURRENT USE OF INSULIN (HCC): ICD-10-CM

## 2022-03-04 DIAGNOSIS — Z12.31 ENCOUNTER FOR SCREENING MAMMOGRAM FOR MALIGNANT NEOPLASM OF BREAST: ICD-10-CM

## 2022-03-04 DIAGNOSIS — Z23 NEED FOR VACCINATION: ICD-10-CM

## 2022-03-04 DIAGNOSIS — Z12.11 COLON CANCER SCREENING: ICD-10-CM

## 2022-03-04 PROCEDURE — 90732 PPSV23 VACC 2 YRS+ SUBQ/IM: CPT | Performed by: FAMILY MEDICINE

## 2022-03-04 PROCEDURE — 92250 FUNDUS PHOTOGRAPHY W/I&R: CPT | Mod: TC | Performed by: FAMILY MEDICINE

## 2022-03-04 PROCEDURE — 99214 OFFICE O/P EST MOD 30 MIN: CPT | Mod: 25 | Performed by: FAMILY MEDICINE

## 2022-03-04 PROCEDURE — 90471 IMMUNIZATION ADMIN: CPT | Performed by: FAMILY MEDICINE

## 2022-03-04 RX ORDER — ZOSTER VACCINE RECOMBINANT, ADJUVANTED 50 MCG/0.5
0.5 KIT INTRAMUSCULAR ONCE
Qty: 0.5 ML | Refills: 1 | Status: SHIPPED | OUTPATIENT
Start: 2022-03-04 | End: 2022-03-04

## 2022-03-04 ASSESSMENT — PATIENT HEALTH QUESTIONNAIRE - PHQ9
5. POOR APPETITE OR OVEREATING: 0 - NOT AT ALL
SUM OF ALL RESPONSES TO PHQ QUESTIONS 1-9: 1
CLINICAL INTERPRETATION OF PHQ2 SCORE: 1

## 2022-03-04 ASSESSMENT — FIBROSIS 4 INDEX: FIB4 SCORE: 1.11

## 2022-03-04 NOTE — ASSESSMENT & PLAN NOTE
A1c:   Lab Results   Component Value Date/Time    HBA1C 6.5 (H) 10/14/2021 1109    AVGLUC 140 10/14/2021 1109     Lipids:   Lab Results   Component Value Date/Time    CHOLSTRLTOT 172 02/25/2022 11:49 AM    TRIGLYCERIDE 85 02/25/2022 11:49 AM    HDL 52 02/25/2022 11:49 AM     (H) 02/25/2022 11:49 AM   ]  BMP:   Lab Results   Component Value Date/Time    SODIUM 141 02/25/2022 1149    POTASSIUM 4.5 02/25/2022 1149    CHLORIDE 106 02/25/2022 1149    CO2 25 02/25/2022 1149    GLUCOSE 120 (H) 02/25/2022 1149    BUN 11 02/25/2022 1149    CREATININE 0.68 02/25/2022 1149    CALCIUM 9.4 02/25/2022 1149    ANION 10.0 02/25/2022 1149     GFR:   Lab Results   Component Value Date/Time    IFAFRICA >60 02/25/2022 1149    IFNOTAFR >60 02/25/2022 1149     Last eye exam: attempted in clinic, advised to get retinal screening with local optometrist  Foot exam: normal sensation to monofilament testing b/l  No callus or ulcers  Medications: met 500 bid, atorvastatin losartan

## 2022-03-04 NOTE — PROGRESS NOTES
Subjective:   Kat Lama is a 61 y.o. female here today for evaluation and management of:     Atypical squamous cells cannot exclude high grade squamous intraepithelial lesion on cytologic smear of cervix (ASC-H)  Information to follow up with ob/gyn provided.       Colon cancer screening  Patient had normal colonoscopy in [facility] when she was 56. She is 61, next due in 5 years.   Since no records will check cologuard  She talks when under anesthesia    Diabetes mellitus (HCC)  A1c:   Lab Results   Component Value Date/Time    HBA1C 6.5 (H) 10/14/2021 1109    AVGLUC 140 10/14/2021 1109     Lipids:   Lab Results   Component Value Date/Time    CHOLSTRLTOT 172 02/25/2022 11:49 AM    TRIGLYCERIDE 85 02/25/2022 11:49 AM    HDL 52 02/25/2022 11:49 AM     (H) 02/25/2022 11:49 AM   ]  BMP:   Lab Results   Component Value Date/Time    SODIUM 141 02/25/2022 1149    POTASSIUM 4.5 02/25/2022 1149    CHLORIDE 106 02/25/2022 1149    CO2 25 02/25/2022 1149    GLUCOSE 120 (H) 02/25/2022 1149    BUN 11 02/25/2022 1149    CREATININE 0.68 02/25/2022 1149    CALCIUM 9.4 02/25/2022 1149    ANION 10.0 02/25/2022 1149     GFR:   Lab Results   Component Value Date/Time    IFAFRICA >60 02/25/2022 1149    IFNOTAFR >60 02/25/2022 1149     Last eye exam: attempted in clinic, advised to get retinal screening with local optometrist  Foot exam: normal sensation to monofilament testing b/l  No callus or ulcers  Medications: met 500 bid, atorvastatin losartan           Current medicines (including changes today)  Current Outpatient Medications   Medication Sig Dispense Refill   • Zoster Vac Recomb Adjuvanted (SHINGRIX) 50 MCG/0.5ML Recon Susp Inject 0.5 mL into the shoulder, thigh, or buttocks one time for 1 dose. 0.5 mL 1   • losartan (COZAAR) 50 MG Tab Take 1 Tablet by mouth every day. 90 Tablet 0   • metFORMIN (GLUCOPHAGE) 500 MG Tab Take 1 Tablet by mouth 2 times a day with meals. 180 Tablet 3   • metoprolol SR (TOPROL XL) 50  "MG TABLET SR 24 HR Take 1 Tablet by mouth every day. 90 Tablet 3   • atorvastatin (LIPITOR) 20 MG Tab Take 1 Tablet by mouth every evening. 90 Tablet 1   • diphenhydrAMINE (BENADRYL) 50 MG Cap Take 50 mg by mouth every 6 hours as needed.       No current facility-administered medications for this visit.     She  has a past medical history of Anemia, Diabetes mellitus (HCC) (11/2/2021), GERD (gastroesophageal reflux disease), Hyperlipidemia, Hypertension, and Substance abuse ([facility]).    She has no past medical history of Anxiety, Arrhythmia, Arthritis, Asthma, Blood transfusion without reported diagnosis, Cancer (HCC), CHF (congestive heart failure) (HCC), Clotting disorder (HCC), COPD (chronic obstructive pulmonary disease) (HCC), Depression, Glaucoma, Heart attack (HCC), Heart murmur, HIV (human immunodeficiency virus infection) (HCC), Kidney disease, Migraine, Seizure (HCC), Stroke (HCC), or Thyroid disease.    ROS  No chest pain, no shortness of breath, no abdominal pain       Objective:     /80   Pulse 65   Temp 36.8 °C (98.2 °F) (Temporal)   Resp 14   Ht 1.66 m (5' 5.34\")   Wt 87.1 kg (192 lb)   SpO2 96%  Body mass index is 31.62 kg/m².   Physical Exam:  Constitutional: Alert, no distress.  Skin: Warm, dry, good turgor, no rashes in visible areas.  Eye: Equal, round and reactive, conjunctiva clear, lids normal.  ENMT: Lips without lesions, good dentition, oropharynx clear.  Neck: Trachea midline, no masses, no thyromegaly. No cervical or supraclavicular lymphadenopathy  Respiratory: Unlabored respiratory effort, lungs clear to auscultation, no wheezes, no ronchi.  Cardiovascular: Normal S1, S2, no murmur, no edema.  Abdomen: Soft, non-tender, no masses, no hepatosplenomegaly.  Psych: Alert and oriented x3, normal affect and mood.        Assessment and Plan:   The following treatment plan was discussed    1. Atypical squamous cells cannot exclude high grade squamous intraepithelial lesion on cytologic " smear of cervix (ASC-H)      2. Colon cancer screening      3. Need for vaccination    - Pneumovax Vaccine (PPSV23)    4. Type 2 diabetes mellitus with other circulatory complication, without long-term current use of insulin (HCC)    - Microalbumin Creat Ratio Urine (Lab Collect); Future  - Diabetic Monofilament LE Exam  - POCT Retinal Eye Exam    5. Encounter for hepatitis C screening test for low risk patient    - HCV Scrn ( [id number] 1xLife); Future      Followup: Return in about 6 months (around 2022) for follow up on labs, abnormal pap.

## 2022-03-04 NOTE — ASSESSMENT & PLAN NOTE
Patient had normal colonoscopy in [facility] when she was 56. She is 61, next due in 5 years.   Since no records will check cologuard  She talks when under anesthesia

## 2022-04-26 DIAGNOSIS — E78.2 MIXED HYPERLIPIDEMIA: ICD-10-CM

## 2022-04-26 DIAGNOSIS — E11.59 TYPE 2 DIABETES MELLITUS WITH OTHER CIRCULATORY COMPLICATION, WITHOUT LONG-TERM CURRENT USE OF INSULIN (HCC): ICD-10-CM

## 2022-04-26 DIAGNOSIS — I10 PRIMARY HYPERTENSION: ICD-10-CM

## 2022-04-26 RX ORDER — ATORVASTATIN CALCIUM 20 MG/1
20 TABLET, FILM COATED ORAL EVERY EVENING
Qty: 90 TABLET | Refills: 1 | Status: SHIPPED | OUTPATIENT
Start: 2022-04-26 | End: 2022-11-08 | Stop reason: SDUPTHER

## 2022-04-26 RX ORDER — LOSARTAN POTASSIUM 50 MG/1
50 TABLET ORAL DAILY
Qty: 90 TABLET | Refills: 0 | Status: SHIPPED | OUTPATIENT
Start: 2022-04-26 | End: 2022-07-26 | Stop reason: SDUPTHER

## 2022-04-26 NOTE — TELEPHONE ENCOUNTER
Received request via: Pharmacy    Was the patient seen in the last year in this department? Yes    Does the patient have an active prescription (recently filled or refills available) for medication(s) requested? No     Last Office Visit:03/04/2022  Last Labs:02/25/2022

## 2022-07-13 LAB — RETINAL SCREEN: NORMAL

## 2022-07-26 DIAGNOSIS — I10 PRIMARY HYPERTENSION: ICD-10-CM

## 2022-07-26 RX ORDER — LOSARTAN POTASSIUM 50 MG/1
50 TABLET ORAL DAILY
Qty: 90 TABLET | Refills: 3 | Status: SHIPPED | OUTPATIENT
Start: 2022-07-26

## 2022-07-26 NOTE — TELEPHONE ENCOUNTER
Received request via: Pharmacy    Was the patient seen in the last year in this department? Yes    Does the patient have an active prescription (recently filled or refills available) for medication(s) requested? No     Last OV: 3/4/2022  Next OV: 9/6/2022  Last Labs: 4/26/2022

## 2022-09-16 ENCOUNTER — TELEPHONE (OUTPATIENT)
Dept: URGENT CARE | Facility: PHYSICIAN GROUP | Age: 62
End: 2022-09-16
Payer: OTHER GOVERNMENT

## 2022-10-28 DIAGNOSIS — I10 PRIMARY HYPERTENSION: ICD-10-CM

## 2022-11-01 RX ORDER — METOPROLOL SUCCINATE 50 MG/1
50 TABLET, EXTENDED RELEASE ORAL DAILY
Qty: 90 TABLET | Refills: 3 | Status: SHIPPED | OUTPATIENT
Start: 2022-11-01

## 2022-11-08 DIAGNOSIS — E78.2 MIXED HYPERLIPIDEMIA: ICD-10-CM

## 2022-11-09 RX ORDER — ATORVASTATIN CALCIUM 20 MG/1
20 TABLET, FILM COATED ORAL EVERY EVENING
Qty: 90 TABLET | Refills: 1 | Status: SHIPPED | OUTPATIENT
Start: 2022-11-09 | End: 2023-04-24

## 2023-04-20 DIAGNOSIS — E78.2 MIXED HYPERLIPIDEMIA: ICD-10-CM

## 2023-04-24 RX ORDER — ATORVASTATIN CALCIUM 20 MG/1
20 TABLET, FILM COATED ORAL EVERY EVENING
Qty: 90 TABLET | Refills: 1 | Status: SHIPPED | OUTPATIENT
Start: 2023-04-24 | End: 2023-10-25 | Stop reason: SDUPTHER

## 2023-04-24 NOTE — TELEPHONE ENCOUNTER
Received request via: Pharmacy    Was the patient seen in the last year in this department? No    Does the patient have an active prescription (recently filled or refills available) for medication(s) requested? No    Does the patient have [facility] and need 100 day supply (blood pressure, diabetes and cholesterol meds only)? Patient does not have SCP    Last Office Visit:03/04/2022  Last Labs:02/25/2022

## 2023-08-05 ENCOUNTER — OFFICE VISIT (OUTPATIENT)
Dept: URGENT CARE | Facility: PHYSICIAN GROUP | Age: 63
End: 2023-08-05
Payer: OTHER GOVERNMENT

## 2023-08-05 ENCOUNTER — HOSPITAL ENCOUNTER (OUTPATIENT)
Facility: MEDICAL CENTER | Age: 63
End: 2023-08-05
Attending: FAMILY MEDICINE
Payer: OTHER GOVERNMENT

## 2023-08-05 VITALS
BODY MASS INDEX: 36.02 KG/M2 | OXYGEN SATURATION: 96 % | RESPIRATION RATE: 16 BRPM | TEMPERATURE: 97.6 F | DIASTOLIC BLOOD PRESSURE: 62 MMHG | SYSTOLIC BLOOD PRESSURE: 110 MMHG | WEIGHT: 211 LBS | HEART RATE: 78 BPM | HEIGHT: 64 IN

## 2023-08-05 DIAGNOSIS — N39.0 ACUTE URINARY TRACT INFECTION: ICD-10-CM

## 2023-08-05 LAB
APPEARANCE UR: NORMAL
BILIRUB UR STRIP-MCNC: NEGATIVE MG/DL
COLOR UR AUTO: YELLOW
GLUCOSE UR STRIP.AUTO-MCNC: NEGATIVE MG/DL
KETONES UR STRIP.AUTO-MCNC: NEGATIVE MG/DL
LEUKOCYTE ESTERASE UR QL STRIP.AUTO: NORMAL
NITRITE UR QL STRIP.AUTO: NEGATIVE
PH UR STRIP.AUTO: 7 [PH] (ref 5–8)
PROT UR QL STRIP: NEGATIVE MG/DL
RBC UR QL AUTO: NORMAL
SP GR UR STRIP.AUTO: 1.01
UROBILINOGEN UR STRIP-MCNC: 0.2 MG/DL

## 2023-08-05 PROCEDURE — 99214 OFFICE O/P EST MOD 30 MIN: CPT | Performed by: FAMILY MEDICINE

## 2023-08-05 PROCEDURE — 3074F SYST BP LT 130 MM HG: CPT | Performed by: FAMILY MEDICINE

## 2023-08-05 PROCEDURE — 87086 URINE CULTURE/COLONY COUNT: CPT

## 2023-08-05 PROCEDURE — 3078F DIAST BP <80 MM HG: CPT | Performed by: FAMILY MEDICINE

## 2023-08-05 PROCEDURE — 81002 URINALYSIS NONAUTO W/O SCOPE: CPT | Performed by: FAMILY MEDICINE

## 2023-08-05 RX ORDER — CEFDINIR 300 MG/1
CAPSULE ORAL
Qty: 14 CAPSULE | Refills: 0 | Status: SHIPPED | OUTPATIENT
Start: 2023-08-05 | End: 2023-08-12

## 2023-08-05 ASSESSMENT — FIBROSIS 4 INDEX: FIB4 SCORE: 1.14

## 2023-08-05 NOTE — PROGRESS NOTES
Chief Complaint:    Chief Complaint   Patient presents with    UTI     X 1 day with pain with urination, urgency and frequency.  She was prescribed Nitrofurantin on 07-28-23 for UTI by [facility] THOMAS       History of Present Illness:    Symptoms started today. Has dysuria, urinary frequency, and urinary urgency. Took Nitrofurantoin for UTI treatment on 7/28/23, rx'd by [facility] urgent care. She thought she got better with med, until today.      Past Medical History:    Past Medical History:   Diagnosis Date    Anemia     Diabetes mellitus (HCC) 11/2/2021    GERD (gastroesophageal reflux disease)     Hyperlipidemia     Hypertension     Substance abuse ([facility])      Past Surgical History:    Past Surgical History:   Procedure Laterality Date    LAPAROSCOPY       Social History:    Social History     Socioeconomic History    Marital status:      Spouse name: Not on file    Number of children: Not on file    Years of education: Not on file    Highest education level: Not on file   Occupational History    Not on file   Tobacco Use    Smoking status: Former     Packs/day: 0.50     Years: 15.00     Pack years: 7.50     Types: Cigarettes    Smokeless tobacco: Never   Vaping Use    Vaping Use: Former    Substances: Nicotine, Flavoring   Substance and Sexual Activity    Alcohol use: Not Currently    Drug use: Not Currently    Sexual activity: Not on file   Other Topics Concern    Not on file   Social History Narrative    Not on file     Social Determinants of Health     Financial Resource Strain: Not on file   Food Insecurity: Not on file   Transportation Needs: Not on file   Physical Activity: Not on file   Stress: Not on file   Social Connections: Not on file   Intimate Partner Violence: Not on file   Housing Stability: Not on file     Family History:    Family History   Problem Relation Age of Onset    Hypertension Mother     Hyperlipidemia Mother     Clotting Disorder Mother         blood clots    Stroke Mother         brain bleed  "   Arrythmia Mother         A- fib    Cancer Father         lung- smoker    Clotting Disorder Brother         blood clot    No Known Problems Maternal Grandmother     No Known Problems Maternal Grandfather     No Known Problems Paternal Grandmother     No Known Problems Paternal Grandfather      Medications:    Current Outpatient Medications on File Prior to Visit   Medication Sig Dispense Refill    atorvastatin (LIPITOR) 20 MG Tab TAKE 1 TABLET BY MOUTH EVERY EVENING 90 Tablet 1    metoprolol SR (TOPROL XL) 50 MG TABLET SR 24 HR TAKE 1 TABLET BY MOUTH EVERY DAY 90 Tablet 3    losartan (COZAAR) 50 MG Tab Take 1 Tablet by mouth every day. 90 Tablet 3    metFORMIN (GLUCOPHAGE) 500 MG Tab Take 1 Tablet by mouth 2 times a day with meals. 180 Tablet 3     No current facility-administered medications on file prior to visit.     Allergies:    No Known Allergies      Vitals:    Vitals:    23 1357   BP: 110/62   Pulse: 78   Resp: 16   Temp: 36.4 °C (97.6 °F)   TempSrc: Temporal   SpO2: 96%   Weight: 95.7 kg (211 lb)   Height: 1.613 m (5' 3.5\")       Physical Exam:    Constitutional: Vital signs reviewed. Appears well-developed and well-nourished. No acute distress.   Eyes: Sclera white, conjunctivae clear.   ENT: External ears normal. Hearing normal.  Neck: Neck supple.   Pulmonary/Chest: Respirations non-labored.   Musculoskeletal: Normal gait. No muscular atrophy or weakness.  Neurological: Alert and oriented to person, place, and time. Muscle tone normal. Coordination normal.   Skin: No rashes or lesions. Warm, dry, normal turgor.  Psychiatric: Normal mood and affect. Behavior is normal. Judgment and thought content normal.       Diagnostics:    Urine dipstick: small leukocytes, small blood.      Assessment / Plan & Medical Decision Makin. Acute urinary tract infection  - POCT Urinalysis  - URINE CULTURE(NEW); Future  - cefdinir (OMNICEF) 300 MG Cap; 1 CAP BY MOUTH TWICE A DAY X 7 DAYS.  Dispense: 14 Capsule; " Refill: 0       Discussed with her DDX, management options, and risks, benefits, and alternatives to treatment plan agreed upon.    Symptoms started today. Has dysuria, urinary frequency, and urinary urgency. Took Nitrofurantoin for UTI treatment on 7/28/23, rx'd by [facility] urgent care. She thought she got better with med, until today.    Urine dipstick: small leukocytes, small blood.    May use OTC Azo for symptoms as needed.    Agreeable to medication prescribed and send urine for culture.    Advised urine culture result will show in MyChart in a few days.    Will follow-up if needed while waiting for urine culture result.

## 2023-08-07 DIAGNOSIS — N39.0 ACUTE URINARY TRACT INFECTION: ICD-10-CM

## 2023-08-09 LAB
BACTERIA UR CULT: NORMAL
SIGNIFICANT IND 70042: NORMAL
SITE SITE: NORMAL
SOURCE SOURCE: NORMAL

## 2023-10-25 DIAGNOSIS — E78.2 MIXED HYPERLIPIDEMIA: ICD-10-CM

## 2023-10-26 RX ORDER — ATORVASTATIN CALCIUM 20 MG/1
20 TABLET, FILM COATED ORAL EVERY EVENING
Qty: 90 TABLET | Refills: 3 | Status: SHIPPED | OUTPATIENT
Start: 2023-10-26

## 2024-10-10 ENCOUNTER — OFFICE VISIT (OUTPATIENT)
Dept: URGENT CARE | Facility: PHYSICIAN GROUP | Age: 64
End: 2024-10-10
Payer: OTHER GOVERNMENT

## 2024-10-10 VITALS
HEIGHT: 64 IN | SYSTOLIC BLOOD PRESSURE: 124 MMHG | OXYGEN SATURATION: 98 % | BODY MASS INDEX: 35.71 KG/M2 | WEIGHT: 209.2 LBS | RESPIRATION RATE: 16 BRPM | HEART RATE: 90 BPM | DIASTOLIC BLOOD PRESSURE: 86 MMHG | TEMPERATURE: 97.3 F

## 2024-10-10 DIAGNOSIS — H66.002 ACUTE SUPPURATIVE OTITIS MEDIA OF LEFT EAR WITHOUT SPONTANEOUS RUPTURE OF TYMPANIC MEMBRANE, RECURRENCE NOT SPECIFIED: ICD-10-CM

## 2024-10-10 PROCEDURE — 3074F SYST BP LT 130 MM HG: CPT | Performed by: FAMILY MEDICINE

## 2024-10-10 PROCEDURE — 3079F DIAST BP 80-89 MM HG: CPT | Performed by: FAMILY MEDICINE

## 2024-10-10 PROCEDURE — 99213 OFFICE O/P EST LOW 20 MIN: CPT | Performed by: FAMILY MEDICINE

## 2024-10-10 RX ORDER — AMOXICILLIN 875 MG/1
875 TABLET, COATED ORAL 2 TIMES DAILY
Qty: 14 TABLET | Refills: 0 | Status: SHIPPED | OUTPATIENT
Start: 2024-10-10 | End: 2024-10-17

## 2024-10-10 ASSESSMENT — ENCOUNTER SYMPTOMS
VOMITING: 0
MYALGIAS: 0
NAUSEA: 0
EYE DISCHARGE: 0
WEIGHT LOSS: 0
EYE REDNESS: 0

## 2024-10-24 ENCOUNTER — OFFICE VISIT (OUTPATIENT)
Dept: URGENT CARE | Facility: PHYSICIAN GROUP | Age: 64
End: 2024-10-24
Payer: OTHER GOVERNMENT

## 2024-10-24 VITALS
RESPIRATION RATE: 18 BRPM | DIASTOLIC BLOOD PRESSURE: 76 MMHG | TEMPERATURE: 97.6 F | HEIGHT: 64 IN | HEART RATE: 82 BPM | OXYGEN SATURATION: 96 % | BODY MASS INDEX: 35.13 KG/M2 | WEIGHT: 205.8 LBS | SYSTOLIC BLOOD PRESSURE: 122 MMHG

## 2024-10-24 DIAGNOSIS — H66.002 ACUTE SUPPURATIVE OTITIS MEDIA OF LEFT EAR WITHOUT SPONTANEOUS RUPTURE OF TYMPANIC MEMBRANE, RECURRENCE NOT SPECIFIED: ICD-10-CM

## 2024-10-24 PROCEDURE — 3074F SYST BP LT 130 MM HG: CPT | Performed by: FAMILY MEDICINE

## 2024-10-24 PROCEDURE — 99214 OFFICE O/P EST MOD 30 MIN: CPT | Performed by: FAMILY MEDICINE

## 2024-10-24 PROCEDURE — 3078F DIAST BP <80 MM HG: CPT | Performed by: FAMILY MEDICINE

## 2024-10-24 RX ORDER — DOXYCYCLINE HYCLATE 100 MG
100 TABLET ORAL 2 TIMES DAILY
Qty: 14 TABLET | Refills: 0 | Status: SHIPPED | OUTPATIENT
Start: 2024-10-24 | End: 2024-10-31

## 2024-10-24 RX ORDER — FEXOFENADINE HCL AND PSEUDOEPHEDRINE HCI 60; 120 MG/1; MG/1
1 TABLET, EXTENDED RELEASE ORAL 2 TIMES DAILY
Qty: 30 TABLET | Refills: 0 | Status: SHIPPED | OUTPATIENT
Start: 2024-10-24